# Patient Record
Sex: MALE | Employment: OTHER
[De-identification: names, ages, dates, MRNs, and addresses within clinical notes are randomized per-mention and may not be internally consistent; named-entity substitution may affect disease eponyms.]

---

## 2022-10-14 ENCOUNTER — OFFICE VISIT (OUTPATIENT)
Dept: FAMILY MEDICINE CLINIC | Facility: CLINIC | Age: 87
End: 2022-10-14
Payer: MEDICARE

## 2022-10-14 VITALS
OXYGEN SATURATION: 100 % | DIASTOLIC BLOOD PRESSURE: 60 MMHG | BODY MASS INDEX: 20.99 KG/M2 | SYSTOLIC BLOOD PRESSURE: 112 MMHG | WEIGHT: 126 LBS | HEART RATE: 65 BPM | HEIGHT: 65 IN

## 2022-10-14 DIAGNOSIS — Z85.46 HISTORY OF PROSTATE CANCER: ICD-10-CM

## 2022-10-14 DIAGNOSIS — R19.00 PULSATILE ABDOMINAL MASS: ICD-10-CM

## 2022-10-14 DIAGNOSIS — R73.03 PREDIABETES: ICD-10-CM

## 2022-10-14 DIAGNOSIS — E03.9 ACQUIRED HYPOTHYROIDISM: ICD-10-CM

## 2022-10-14 DIAGNOSIS — G30.1 SEVERE LATE ONSET ALZHEIMER'S DEMENTIA WITHOUT BEHAVIORAL DISTURBANCE, PSYCHOTIC DISTURBANCE, MOOD DISTURBANCE, OR ANXIETY (HCC): ICD-10-CM

## 2022-10-14 DIAGNOSIS — K40.90 INGUINAL HERNIA OF LEFT SIDE WITHOUT OBSTRUCTION OR GANGRENE: Primary | ICD-10-CM

## 2022-10-14 DIAGNOSIS — Z00.00 ANNUAL PHYSICAL EXAM: ICD-10-CM

## 2022-10-14 DIAGNOSIS — Z91.81 AT HIGH RISK FOR FALLS: ICD-10-CM

## 2022-10-14 DIAGNOSIS — F02.C0 SEVERE LATE ONSET ALZHEIMER'S DEMENTIA WITHOUT BEHAVIORAL DISTURBANCE, PSYCHOTIC DISTURBANCE, MOOD DISTURBANCE, OR ANXIETY (HCC): ICD-10-CM

## 2022-10-14 LAB
BASOPHILS # BLD: 0.1 K/UL (ref 0–0.2)
BASOPHILS NFR BLD: 1 % (ref 0–2)
BILIRUBIN, URINE, POC: NEGATIVE
BLOOD URINE, POC: ABNORMAL
DIFFERENTIAL METHOD BLD: ABNORMAL
EOSINOPHIL # BLD: 0.9 K/UL (ref 0–0.8)
EOSINOPHIL NFR BLD: 12 % (ref 0.5–7.8)
ERYTHROCYTE [DISTWIDTH] IN BLOOD BY AUTOMATED COUNT: 13.7 % (ref 11.9–14.6)
EST. AVERAGE GLUCOSE BLD GHB EST-MCNC: 126 MG/DL
GLUCOSE URINE, POC: NEGATIVE
HBA1C MFR BLD: 6 % (ref 4.8–5.6)
HCT VFR BLD AUTO: 43.4 % (ref 41.1–50.3)
HGB BLD-MCNC: 14.1 G/DL (ref 13.6–17.2)
IMM GRANULOCYTES # BLD AUTO: 0 K/UL (ref 0–0.5)
IMM GRANULOCYTES NFR BLD AUTO: 0 % (ref 0–5)
KETONES, URINE, POC: NEGATIVE
LEUKOCYTE ESTERASE, URINE, POC: NEGATIVE
LYMPHOCYTES # BLD: 1.1 K/UL (ref 0.5–4.6)
LYMPHOCYTES NFR BLD: 15 % (ref 13–44)
MCH RBC QN AUTO: 31.3 PG (ref 26.1–32.9)
MCHC RBC AUTO-ENTMCNC: 32.5 G/DL (ref 31.4–35)
MCV RBC AUTO: 96.4 FL (ref 82–102)
MONOCYTES # BLD: 0.4 K/UL (ref 0.1–1.3)
MONOCYTES NFR BLD: 6 % (ref 4–12)
NEUTS SEG # BLD: 4.8 K/UL (ref 1.7–8.2)
NEUTS SEG NFR BLD: 66 % (ref 43–78)
NITRITE, URINE, POC: NEGATIVE
NRBC # BLD: 0 K/UL (ref 0–0.2)
PH, URINE, POC: 7 (ref 4.6–8)
PLATELET # BLD AUTO: 241 K/UL (ref 150–450)
PMV BLD AUTO: 11.3 FL (ref 9.4–12.3)
PROTEIN,URINE, POC: NEGATIVE
RBC # BLD AUTO: 4.5 M/UL (ref 4.23–5.6)
SPECIFIC GRAVITY, URINE, POC: 1.02 (ref 1–1.03)
URINALYSIS CLARITY, POC: CLEAR
URINALYSIS COLOR, POC: YELLOW
UROBILINOGEN, POC: 0.2
WBC # BLD AUTO: 7.3 K/UL (ref 4.3–11.1)

## 2022-10-14 PROCEDURE — 99204 OFFICE O/P NEW MOD 45 MIN: CPT | Performed by: FAMILY MEDICINE

## 2022-10-14 PROCEDURE — 1036F TOBACCO NON-USER: CPT | Performed by: FAMILY MEDICINE

## 2022-10-14 PROCEDURE — 1123F ACP DISCUSS/DSCN MKR DOCD: CPT | Performed by: FAMILY MEDICINE

## 2022-10-14 PROCEDURE — 81003 URINALYSIS AUTO W/O SCOPE: CPT | Performed by: FAMILY MEDICINE

## 2022-10-14 PROCEDURE — G8484 FLU IMMUNIZE NO ADMIN: HCPCS | Performed by: FAMILY MEDICINE

## 2022-10-14 PROCEDURE — G8420 CALC BMI NORM PARAMETERS: HCPCS | Performed by: FAMILY MEDICINE

## 2022-10-14 PROCEDURE — G8427 DOCREV CUR MEDS BY ELIG CLIN: HCPCS | Performed by: FAMILY MEDICINE

## 2022-10-14 SDOH — ECONOMIC STABILITY: FOOD INSECURITY: WITHIN THE PAST 12 MONTHS, THE FOOD YOU BOUGHT JUST DIDN'T LAST AND YOU DIDN'T HAVE MONEY TO GET MORE.: NEVER TRUE

## 2022-10-14 SDOH — ECONOMIC STABILITY: FOOD INSECURITY: WITHIN THE PAST 12 MONTHS, YOU WORRIED THAT YOUR FOOD WOULD RUN OUT BEFORE YOU GOT MONEY TO BUY MORE.: NEVER TRUE

## 2022-10-14 ASSESSMENT — PATIENT HEALTH QUESTIONNAIRE - PHQ9
SUM OF ALL RESPONSES TO PHQ QUESTIONS 1-9: 0
SUM OF ALL RESPONSES TO PHQ9 QUESTIONS 1 & 2: 0
1. LITTLE INTEREST OR PLEASURE IN DOING THINGS: 0
SUM OF ALL RESPONSES TO PHQ QUESTIONS 1-9: 0
2. FEELING DOWN, DEPRESSED OR HOPELESS: 0

## 2022-10-14 ASSESSMENT — ENCOUNTER SYMPTOMS
DIARRHEA: 0
BLOOD IN STOOL: 0
CONSTIPATION: 0
NAUSEA: 0
EYES NEGATIVE: 1
RESPIRATORY NEGATIVE: 1

## 2022-10-14 ASSESSMENT — SOCIAL DETERMINANTS OF HEALTH (SDOH): HOW HARD IS IT FOR YOU TO PAY FOR THE VERY BASICS LIKE FOOD, HOUSING, MEDICAL CARE, AND HEATING?: NOT HARD AT ALL

## 2022-10-14 NOTE — PROGRESS NOTES
PROGRESS NOTE    SUBJECTIVE:   Keyla Dial is a 80 y.o. male seen for a follow up visit regarding   Chief Complaint   Patient presents with    New Patient     Establish care. Reports being diagnosed with Dementia, thyroid disorder and diabetes. Hernia     Reports hernia for the past few years. Had surgery [place] the [place] about 5 years ago and hernia has returned in the past 3 years. Reports pain and it's the size of a baseball. Testicle Swelling     Reports swollen testicle. Unsure of how long the swelling has been there. HPI:  Patient is brought in by his daughter today to establish care with us. Patient is spent most of his life in Hospitals in [place]. He does not speak [place], daughter is serving as  today. Daughter has healthcare power of . Patient has been troubled with a left inguinal hernia, causing some discomfort. Passing his stools without difficulty but family notices that he does complain of the hernia getting bigger when he is trying to have a bowel movement. Patient has a history of dementia which seems to be getting worse. He has been on multiple medications according to his daughter, including a medicine to calm him down when he gets agitated. He still is moderately independent but requires supervision. With direction he is able to perform ADLs. Patient has a history of prostate cancer. Patient reportedly has history of prediabetes, was on some medications but his daughter did not bring them with her today. Reviewed and updated this visit by provider:  Tobacco  Allergies  Meds  Problems  Med Hx  Surg Hx  Fam Hx           Review of Systems   Constitutional:  Negative for appetite change and fever. HENT: Negative. Eyes: Negative. Respiratory: Negative. Cardiovascular: Negative. Gastrointestinal:  Negative for blood in stool, constipation, diarrhea and nausea.    Genitourinary:         Some incontinence, presumed to be associated with his dementia, primarily nocturnal   Psychiatric/Behavioral:  Positive for agitation. OBJECTIVE:  Vitals:    10/14/22 1000   BP: 112/60   Site: Left Upper Arm   Cuff Size: Small Adult   Pulse: 65   SpO2: 100%   Weight: 126 lb (57.2 kg)   Height: 5' 5\" (1.651 m)        Physical Exam   General: He is alert presently, clearly confused, does not appear ill, actually appears quite fit for his age. HEENT: Normocephalic; external ears, ear canals demonstrate moderate cerumen,  Tympanic membranes are normal; PERRLA, EOMI, normal conjunctiva; normal nasal mucosa without drainage; oropharynx is moist without mucosal lesion  Neck: No adenopathy, thyromegaly or thyroid nodules  Pulmonary: Normal effort, good airflow, no rales or rhonchi  CVS: Regular rate and rhythm, normal S1, S2, no S3 or S4, no murmurs; no carotid bruits, 2+ pedal pulses  Abdomen: Nondistended, normal bowel sounds, nontender without mass organomegaly, palpable aorta without bruit  Extremities: No cyanosis/clubbing/edema    Medical problems and test results were reviewed with the patient today. No results found for this or any previous visit (from the past 672 hour(s)). No results found for any visits on 10/14/22. ASSESSMENT and PLAN    1. Inguinal hernia of left side without obstruction or gangrene  -     [facility] - Tyson Schneider MD, General Surgery, [facility]  2. At high risk for falls  3. Prediabetes  -     Basic Metabolic Panel; Future  -     Hepatic Function Panel; Future  -     Lipid Panel; Future  -     Hemoglobin A1C; Future  -     AMB POC URINALYSIS DIP STICK AUTO W/O MICRO  4. Acquired hypothyroidism  -     TSH; Future  5. Severe late onset Alzheimer's dementia without behavioral disturbance, psychotic disturbance, mood disturbance, or anxiety ([facility] [address]  6. Annual physical exam  -     CBC with Auto Differential; Future  7. History of prostate cancer  -     PSA, Diagnostic;  Future     Patient's daughter is going to make us aware of his current medications. No follow-ups on file.        Renata Jacobs MD

## 2022-10-15 LAB
ALBUMIN SERPL-MCNC: 4.4 G/DL (ref 3.2–4.6)
ALBUMIN/GLOB SERPL: 1.4 {RATIO} (ref 0.4–1.6)
ALP SERPL-CCNC: 105 U/L (ref 50–136)
ALT SERPL-CCNC: 26 U/L (ref 12–65)
ANION GAP SERPL CALC-SCNC: 3 MMOL/L (ref 2–11)
AST SERPL-CCNC: 20 U/L (ref 15–37)
BILIRUB DIRECT SERPL-MCNC: 0.2 MG/DL
BILIRUB SERPL-MCNC: 0.7 MG/DL (ref 0.2–1.1)
BUN SERPL-MCNC: 19 MG/DL (ref 8–23)
CALCIUM SERPL-MCNC: 9.5 MG/DL (ref 8.3–10.4)
CHLORIDE SERPL-SCNC: 109 MMOL/L (ref 101–110)
CHOLEST SERPL-MCNC: 186 MG/DL
CO2 SERPL-SCNC: 32 MMOL/L (ref 21–32)
CREAT SERPL-MCNC: 1.2 MG/DL (ref 0.8–1.5)
GLOBULIN SER CALC-MCNC: 3.1 G/DL (ref 2.8–4.5)
GLUCOSE SERPL-MCNC: 133 MG/DL (ref 65–100)
HDLC SERPL-MCNC: 44 MG/DL (ref 40–60)
HDLC SERPL: 4.2 {RATIO}
LDLC SERPL CALC-MCNC: 121.4 MG/DL
POTASSIUM SERPL-SCNC: 3.8 MMOL/L (ref 3.5–5.1)
PROT SERPL-MCNC: 7.5 G/DL (ref 6.3–8.2)
SODIUM SERPL-SCNC: 144 MMOL/L (ref 133–143)
TRIGL SERPL-MCNC: 103 MG/DL (ref 35–150)
TSH, 3RD GENERATION: 5.38 UIU/ML (ref 0.36–3.74)
VLDLC SERPL CALC-MCNC: 20.6 MG/DL (ref 6–23)

## 2022-10-17 ENCOUNTER — TELEPHONE (OUTPATIENT)
Dept: FAMILY MEDICINE CLINIC | Facility: CLINIC | Age: 87
End: 2022-10-17

## 2022-10-17 NOTE — TELEPHONE ENCOUNTER
Medication update    Eutirox 25 mcg    Eutebrol 10 mg    Quetaxil 25 mg    Nootrox 1200 mg    Doneclar 5 mg    Lisiniopril-HCTZ 10/12.5 mg    Sedoxil 1/2 am 1/2 pm    Super B-Complex    Men's one a day

## 2022-10-18 DIAGNOSIS — E03.9 ACQUIRED HYPOTHYROIDISM: Primary | ICD-10-CM

## 2022-10-18 RX ORDER — QUETIAPINE FUMARATE 25 MG/1
25 TABLET, FILM COATED ORAL 2 TIMES DAILY
COMMUNITY

## 2022-10-18 RX ORDER — LEVOTHYROXINE SODIUM 0.05 MG/1
50 TABLET ORAL DAILY
Qty: 90 TABLET | Refills: 1 | Status: SHIPPED | OUTPATIENT
Start: 2022-10-18

## 2022-10-18 RX ORDER — DONEPEZIL HYDROCHLORIDE 5 MG/1
5 TABLET, FILM COATED ORAL NIGHTLY
COMMUNITY

## 2022-10-18 RX ORDER — MEMANTINE HYDROCHLORIDE 10 MG/1
10 TABLET ORAL 2 TIMES DAILY
COMMUNITY
End: 2022-10-18 | Stop reason: DRUGHIGH

## 2022-10-18 RX ORDER — LEVOTHYROXINE SODIUM 0.03 MG/1
25 TABLET ORAL DAILY
COMMUNITY
End: 2022-10-18

## 2022-10-18 RX ORDER — LISINOPRIL AND HYDROCHLOROTHIAZIDE 12.5; 1 MG/1; MG/1
1 TABLET ORAL DAILY
COMMUNITY

## 2022-10-18 RX ORDER — MEMANTINE HYDROCHLORIDE 10 MG/1
10 TABLET ORAL DAILY
Qty: 60 TABLET | Status: SHIPPED | COMMUNITY
Start: 2022-10-18

## 2022-10-18 NOTE — TELEPHONE ENCOUNTER
Patients daughter contacted with lab results. Daughter is wanting to know if patient could discontinue Seroquel and Nootrox. Reports patient is having night mcmullen and hallucinating on medications. Reports that patient does not have depression or bipolar disorder. Unsure of why medications were prescribed by provider in [facility]. Pended Rx for Levothyroxine 50 mcg.

## 2022-10-25 ENCOUNTER — HOSPITAL ENCOUNTER (OUTPATIENT)
Dept: CT IMAGING | Age: 87
Discharge: HOME | End: 2022-10-28
Payer: MEDICARE

## 2022-10-25 DIAGNOSIS — R19.00 PULSATILE ABDOMINAL MASS: ICD-10-CM

## 2022-10-25 PROCEDURE — 74177 CT ABD & PELVIS W/CONTRAST: CPT

## 2022-10-25 PROCEDURE — 6360000004 HC RX CONTRAST MEDICATION

## 2022-10-25 PROCEDURE — 2580000003 HC RX 258

## 2022-10-25 RX ORDER — 0.9 % SODIUM CHLORIDE 0.9 %
100 INTRAVENOUS SOLUTION INTRAVENOUS ONCE
Status: COMPLETED | OUTPATIENT
Start: 2022-10-25 | End: 2022-10-25

## 2022-10-25 RX ORDER — SODIUM CHLORIDE 0.9 % (FLUSH) 0.9 %
10 SYRINGE (ML) INJECTION
Status: COMPLETED | OUTPATIENT
Start: 2022-10-25 | End: 2022-10-25

## 2022-10-25 RX ADMIN — SODIUM CHLORIDE 100 ML: 9 INJECTION, SOLUTION INTRAVENOUS at 10:06

## 2022-10-25 RX ADMIN — DIATRIZOATE MEGLUMINE AND DIATRIZOATE SODIUM 15 ML: 660; 100 LIQUID ORAL; RECTAL at 10:06

## 2022-10-25 RX ADMIN — IOPAMIDOL 100 ML: 755 INJECTION, SOLUTION INTRAVENOUS at 10:05

## 2022-10-25 RX ADMIN — SODIUM CHLORIDE, PRESERVATIVE FREE 10 ML: 5 INJECTION INTRAVENOUS at 10:06

## 2022-10-27 ENCOUNTER — PREP FOR PROCEDURE (OUTPATIENT)
Dept: SURGERY | Age: 87
End: 2022-10-27

## 2022-10-27 ENCOUNTER — OFFICE VISIT (OUTPATIENT)
Dept: SURGERY | Age: 87
End: 2022-10-27
Payer: MEDICARE

## 2022-10-27 VITALS
HEIGHT: 65 IN | WEIGHT: 126 LBS | BODY MASS INDEX: 20.99 KG/M2 | DIASTOLIC BLOOD PRESSURE: 70 MMHG | OXYGEN SATURATION: 96 % | HEART RATE: 81 BPM | SYSTOLIC BLOOD PRESSURE: 120 MMHG

## 2022-10-27 DIAGNOSIS — K40.91 RECURRENT LEFT INGUINAL HERNIA: ICD-10-CM

## 2022-10-27 PROBLEM — K40.90 INGUINAL HERNIA: Status: ACTIVE | Noted: 2022-01-01

## 2022-10-27 PROCEDURE — G8420 CALC BMI NORM PARAMETERS: HCPCS | Performed by: SURGERY

## 2022-10-27 PROCEDURE — 1036F TOBACCO NON-USER: CPT | Performed by: SURGERY

## 2022-10-27 PROCEDURE — 99202 OFFICE O/P NEW SF 15 MIN: CPT | Performed by: SURGERY

## 2022-10-27 PROCEDURE — 1123F ACP DISCUSS/DSCN MKR DOCD: CPT | Performed by: SURGERY

## 2022-10-27 PROCEDURE — G8484 FLU IMMUNIZE NO ADMIN: HCPCS | Performed by: SURGERY

## 2022-10-27 PROCEDURE — G8427 DOCREV CUR MEDS BY ELIG CLIN: HCPCS | Performed by: SURGERY

## 2022-10-27 RX ORDER — PIRACETAM
1200 POWDER (GRAM) MISCELLANEOUS
COMMUNITY
End: 2022-11-09

## 2022-10-27 NOTE — PROGRESS NOTES
[facility]  [facility]. [address] [place] [address]  [phone]    Office Note/H&P/Consult Note   Megan Siddiqui   MRN: [id number]     : 1935        HPI: Megan Siddiqui is a 80 y.o. male who is here to see me today with a recurrent left inguinal hernia. He is from the [facility]. He is here with his daughter and her . Left inguinal hernia was repaired in 2017. It is causing him some discomfort now that this recurred. No fever or chills. No change in bowel habits. Has Alzheimer's dementia. Past Medical History:   Diagnosis Date    Dementia ([address]     Diabetes mellitus ([address]     Hyperthyroidism      Past Surgical History:   Procedure Laterality Date    HERNIA REPAIR      SKIN CANCER EXCISION       Current Outpatient Medications   Medication Sig    Piracetam POWD 1,200 mg by Does not apply route    QUEtiapine (SEROQUEL) 25 MG tablet Take 25 mg by mouth 2 times daily    donepezil (ARICEPT) 5 MG tablet Take 5 mg by mouth nightly    MISC NATURAL PRODUCTS PO Take 0.5 tablets by mouth in the morning and at bedtime Sedoxil    MISC NATURAL PRODUCTS PO Take 1 tablet by mouth daily Nootrox 1200 mg    B Complex-C (SUPER B COMPLEX PO) Take 1 tablet by mouth daily    Multiple Vitamins-Minerals (ONE-A-DAY MENS 50+ PO) Take 1 tablet by mouth daily    memantine (NAMENDA) 10 MG tablet Take 1 tablet by mouth daily    levothyroxine (SYNTHROID) 50 MCG tablet Take 1 tablet by mouth daily    lisinopril-hydroCHLOROthiazide (PRINZIDE;ZESTORETIC) 10-12.5 MG per tablet Take 1 tablet by mouth daily (Patient not taking: Reported on 10/27/2022)     No current facility-administered medications for this visit. Facility-Administered Medications Ordered in Other Visits   Medication Dose Route Frequency    diatrizoate meglumine-sodium (GASTROGRAFIN) 66-10 % solution 15 mL  15 mL Oral ONCE PRN     ALLERGIES:  Patient has no known allergies.     Social History Socioeconomic History    Marital status:      Spouse name: None    Number of children: None    Years of education: None    Highest education level: None   Tobacco Use    Smoking status: Former     Types: Cigarettes     Quit date: 1978     Years since quittin.2    Smokeless tobacco: Never   Vaping Use    Vaping Use: Never used   Substance and Sexual Activity    Alcohol use: Not Currently    Drug use: Never     Social Determinants of Health     Financial Resource Strain: Low Risk     Difficulty of Paying Living Expenses: Not hard at all   Food Insecurity: No Food Insecurity    Worried About Running Out of Food in the Last Year: Never true    Ran Out of Food in the Last Year: Never true     Social History     Tobacco Use   Smoking Status Former    Types: Cigarettes    Quit date: 1978    Years since quittin.2   Smokeless Tobacco Never     Family History   Problem Relation Age of Onset    No Known Problems Mother     No Known Problems Father     Colon Cancer Paternal Aunt        ROS: The patient has no difficulty with chest pain or shortness of breath. No fever or chills. Comprehensive review of systems was otherwise unremarkable except as noted above. Physical Exam:   Constitutional: Alert oriented cooperative patient in no acute distress. /70   Pulse 81   Ht 5' 5\" (1.651 m)   Wt 126 lb (57.2 kg)   SpO2 96%   BMI 20.97 kg/m²   Eyes:Sclera are clear, pupils symmetric   ENMT: ears have no obvious external lesions; no obvious neck masses; no lip lesions  CV: RRR. Normal perfusion; no embolic signs  Resp: No JVD. Breathing is  non-labored. No audible wheezing   GI: soft and non-distended. Large recurrent left indirect inguinal hernia is present which is reducible. No evidence of a right inguinal hernia. Musculoskeletal: no significant asymmetry; normal tone; unremarkable with normal function.    Neuro:  No obvious focal deficits; moves all 4; A&O x3  Psychiatric: normal affect and mood, no memory impairment      Labs:  Lab Results   Component Value Date/Time    WBC 7.3 10/14/2022 10:44 AM    HGB 14.1 10/14/2022 10:44 AM     10/14/2022 10:44 AM     10/14/2022 10:44 AM    K 3.8 10/14/2022 10:44 AM     10/14/2022 10:44 AM    CO2 32 10/14/2022 10:44 AM    BUN 19 10/14/2022 10:44 AM    ALT 26 10/14/2022 10:44 AM       No results found for this or any previous visit. I reviewed patient's medications, labs, and independently reviewed relevant radiologic studies if available. Labs/radiology studies as ordered in connect care or in scanned in media tab if pt is pre-op. Amt of data reviewed moderate-extensive. I spent 20 minutes with him this morning greater than 50% this time was spent in counseling. Assessment/Plan:     )Yun Brown is a 80 y.o. male who has signs and symptoms consistent with the below issues:  Large recurrent left inguinal hernia. They very much would like to have this repaired. He does as well. We will do this openly. They are okay with mesh. I discussed the risks which include bleeding, nerve injury, recurrence as well as infection. All questions were answered. Proceed with recurrent left inguinal hernia repair with mesh.           Sirena Rouse MD,  FACS

## 2022-10-31 ENCOUNTER — PREP FOR PROCEDURE (OUTPATIENT)
Dept: SURGERY | Age: 87
End: 2022-10-31

## 2022-11-01 RX ORDER — SODIUM CHLORIDE 0.9 % (FLUSH) 0.9 %
5-40 SYRINGE (ML) INJECTION EVERY 12 HOURS SCHEDULED
Status: CANCELLED | OUTPATIENT
Start: 2022-11-01

## 2022-11-01 RX ORDER — SODIUM CHLORIDE 9 MG/ML
INJECTION, SOLUTION INTRAVENOUS PRN
Status: CANCELLED | OUTPATIENT
Start: 2022-11-01

## 2022-11-01 RX ORDER — SODIUM CHLORIDE 0.9 % (FLUSH) 0.9 %
5-40 SYRINGE (ML) INJECTION PRN
Status: CANCELLED | OUTPATIENT
Start: 2022-11-01

## 2022-11-09 RX ORDER — QUETIAPINE FUMARATE 25 MG/1
25 TABLET, FILM COATED ORAL NIGHTLY
COMMUNITY

## 2022-11-11 ENCOUNTER — OFFICE VISIT (OUTPATIENT)
Dept: FAMILY MEDICINE CLINIC | Facility: CLINIC | Age: 87
End: 2022-11-11
Payer: MEDICARE

## 2022-11-11 VITALS
DIASTOLIC BLOOD PRESSURE: 68 MMHG | WEIGHT: 127.8 LBS | HEIGHT: 65 IN | SYSTOLIC BLOOD PRESSURE: 118 MMHG | HEART RATE: 59 BPM | OXYGEN SATURATION: 100 % | BODY MASS INDEX: 21.29 KG/M2

## 2022-11-11 DIAGNOSIS — G30.1 SEVERE LATE ONSET ALZHEIMER'S DEMENTIA WITHOUT BEHAVIORAL DISTURBANCE, PSYCHOTIC DISTURBANCE, MOOD DISTURBANCE, OR ANXIETY (HCC): Primary | ICD-10-CM

## 2022-11-11 DIAGNOSIS — E03.9 ACQUIRED HYPOTHYROIDISM: ICD-10-CM

## 2022-11-11 DIAGNOSIS — F02.C0 SEVERE LATE ONSET ALZHEIMER'S DEMENTIA WITHOUT BEHAVIORAL DISTURBANCE, PSYCHOTIC DISTURBANCE, MOOD DISTURBANCE, OR ANXIETY (HCC): Primary | ICD-10-CM

## 2022-11-11 DIAGNOSIS — K40.90 INGUINAL HERNIA OF LEFT SIDE WITHOUT OBSTRUCTION OR GANGRENE: ICD-10-CM

## 2022-11-11 PROCEDURE — 99214 OFFICE O/P EST MOD 30 MIN: CPT | Performed by: FAMILY MEDICINE

## 2022-11-11 PROCEDURE — G8427 DOCREV CUR MEDS BY ELIG CLIN: HCPCS | Performed by: FAMILY MEDICINE

## 2022-11-11 PROCEDURE — 1036F TOBACCO NON-USER: CPT | Performed by: FAMILY MEDICINE

## 2022-11-11 PROCEDURE — G8484 FLU IMMUNIZE NO ADMIN: HCPCS | Performed by: FAMILY MEDICINE

## 2022-11-11 PROCEDURE — 1123F ACP DISCUSS/DSCN MKR DOCD: CPT | Performed by: FAMILY MEDICINE

## 2022-11-11 PROCEDURE — G8420 CALC BMI NORM PARAMETERS: HCPCS | Performed by: FAMILY MEDICINE

## 2022-11-11 RX ORDER — DONEPEZIL HYDROCHLORIDE 5 MG/1
5 TABLET, FILM COATED ORAL NIGHTLY
Qty: 90 TABLET | Refills: 1 | Status: SHIPPED | OUTPATIENT
Start: 2022-11-11 | End: 2023-05-10

## 2022-11-11 RX ORDER — MEMANTINE HYDROCHLORIDE 10 MG/1
10 TABLET ORAL DAILY
Qty: 90 TABLET | Refills: 1 | Status: SHIPPED | OUTPATIENT
Start: 2022-11-11 | End: 2023-05-10

## 2022-11-11 ASSESSMENT — ENCOUNTER SYMPTOMS
RESPIRATORY NEGATIVE: 1
NAUSEA: 0
VOMITING: 0
DIARRHEA: 0

## 2022-11-11 ASSESSMENT — PATIENT HEALTH QUESTIONNAIRE - PHQ9
SUM OF ALL RESPONSES TO PHQ QUESTIONS 1-9: 0
2. FEELING DOWN, DEPRESSED OR HOPELESS: 0
SUM OF ALL RESPONSES TO PHQ QUESTIONS 1-9: 0
SUM OF ALL RESPONSES TO PHQ9 QUESTIONS 1 & 2: 0
1. LITTLE INTEREST OR PLEASURE IN DOING THINGS: 0
SUM OF ALL RESPONSES TO PHQ QUESTIONS 1-9: 0
SUM OF ALL RESPONSES TO PHQ QUESTIONS 1-9: 0

## 2022-11-11 NOTE — PROGRESS NOTES
PROGRESS NOTE    SUBJECTIVE:   Megan Siddiqui is a 80 y.o. male seen for a follow up visit regarding   Chief Complaint   Patient presents with    Hypertension     Brought BP readings from home; reports controlled BP. Depression     Would like to discuss discontinuing Seroquel. PHQ 0 today. Dementia     Would like to discuss if he should take Namenda and Donepezil together        HPI:  Here for follow-up. Doing well. Having some behavior issues at night, gets up and urinates on the floor. He was taking some quetiapine for rest and behavior at night but this does not seem to be effective and his daughter would like to stop this. He takes a benzodiazepine medication from [place], morning and midday but did not take this at bedtime. His daughter would like to try this at bedtime to see if he will rest better. Patient is scheduled for hernia surgery next week. Overall, physically patient is doing well, dementia is his main issue presently. Hypertension    Depression  Memory Loss         Reviewed and updated this visit by provider:  Tobacco  Allergies  Meds  Problems  Med Hx  Surg Hx  Fam Hx           Review of Systems   Respiratory: Negative. Cardiovascular: Negative. Gastrointestinal:  Negative for diarrhea, nausea and vomiting. Psychiatric/Behavioral:  Positive for depression. OBJECTIVE:  Vitals:    11/11/22 1002   BP: 118/68   Site: Left Upper Arm   Cuff Size: Medium Adult   Pulse: 59   SpO2: 100%   Weight: 127 lb 12.8 oz (58 kg)   Height: 5' 5\" (1.651 m)        Physical Exam   General: Alert, pleasantly demented   Pulmonary: Good airflow, no rales or rhonchi  CVS: Regular rate and rhythm, normal S1, normal S2, no S3 or S4, no murmur appreciated    medical problems and test results were reviewed with the patient today. No results found for this or any previous visit (from the past 672 hour(s)). No results found for any visits on 11/11/22. ASSESSMENT and PLAN    1. Severe late onset Alzheimer's dementia without behavioral disturbance, psychotic disturbance, mood disturbance, or anxiety ([facility])  -     donepezil (ARICEPT) 5 MG tablet; Take 1 tablet by mouth nightly, Disp-90 tablet, R-1Normal  -     memantine (NAMENDA) 10 MG tablet; Take 1 tablet by mouth daily, Disp-90 tablet, R-1Normal  2. Acquired hypothyroidism  3. Inguinal hernia of left side without obstruction or gangrene         Return in about 6 months (around 5/11/2023).        Jon Alavrado MD

## 2022-11-13 ENCOUNTER — ANESTHESIA EVENT (OUTPATIENT)
Dept: SURGERY | Age: 87
End: 2022-11-13
Payer: MEDICARE

## 2022-11-14 ENCOUNTER — ANESTHESIA (OUTPATIENT)
Dept: SURGERY | Age: 87
End: 2022-11-14
Payer: MEDICARE

## 2022-11-14 ENCOUNTER — HOSPITAL ENCOUNTER (OUTPATIENT)
Age: 87
Setting detail: OUTPATIENT SURGERY
Discharge: HOME | End: 2022-11-14
Attending: SURGERY | Admitting: SURGERY
Payer: MEDICARE

## 2022-11-14 VITALS
DIASTOLIC BLOOD PRESSURE: 58 MMHG | BODY MASS INDEX: 21.33 KG/M2 | HEART RATE: 80 BPM | TEMPERATURE: 97.5 F | OXYGEN SATURATION: 95 % | RESPIRATION RATE: 14 BRPM | SYSTOLIC BLOOD PRESSURE: 120 MMHG | HEIGHT: 65 IN | WEIGHT: 128 LBS

## 2022-11-14 DIAGNOSIS — K40.91 RECURRENT LEFT INGUINAL HERNIA: Primary | ICD-10-CM

## 2022-11-14 LAB
GLUCOSE BLD STRIP.AUTO-MCNC: 93 MG/DL (ref 65–100)
SERVICE CMNT-IMP: NORMAL

## 2022-11-14 PROCEDURE — 49520 REREPAIR ING HERNIA REDUCE: CPT | Performed by: SURGERY

## 2022-11-14 PROCEDURE — C1781 MESH (IMPLANTABLE): HCPCS | Performed by: SURGERY

## 2022-11-14 PROCEDURE — 2500000003 HC RX 250 WO HCPCS: Performed by: SURGERY

## 2022-11-14 PROCEDURE — 7100000000 HC PACU RECOVERY - FIRST 15 MIN: Performed by: SURGERY

## 2022-11-14 PROCEDURE — 82962 GLUCOSE BLOOD TEST: CPT

## 2022-11-14 PROCEDURE — 6360000002 HC RX W HCPCS: Performed by: NURSE ANESTHETIST, CERTIFIED REGISTERED

## 2022-11-14 PROCEDURE — 6360000002 HC RX W HCPCS: Performed by: SURGERY

## 2022-11-14 PROCEDURE — 2580000003 HC RX 258: Performed by: NURSE ANESTHETIST, CERTIFIED REGISTERED

## 2022-11-14 PROCEDURE — 7100000010 HC PHASE II RECOVERY - FIRST 15 MIN: Performed by: SURGERY

## 2022-11-14 PROCEDURE — 3600000013 HC SURGERY LEVEL 3 ADDTL 15MIN: Performed by: SURGERY

## 2022-11-14 PROCEDURE — 2500000003 HC RX 250 WO HCPCS: Performed by: NURSE ANESTHETIST, CERTIFIED REGISTERED

## 2022-11-14 PROCEDURE — 2580000003 HC RX 258: Performed by: ANESTHESIOLOGY

## 2022-11-14 PROCEDURE — 6370000000 HC RX 637 (ALT 250 FOR IP): Performed by: ANESTHESIOLOGY

## 2022-11-14 PROCEDURE — 7100000001 HC PACU RECOVERY - ADDTL 15 MIN: Performed by: SURGERY

## 2022-11-14 PROCEDURE — 2709999900 HC NON-CHARGEABLE SUPPLY: Performed by: SURGERY

## 2022-11-14 PROCEDURE — 3700000000 HC ANESTHESIA ATTENDED CARE: Performed by: SURGERY

## 2022-11-14 PROCEDURE — 3600000003 HC SURGERY LEVEL 3 BASE: Performed by: SURGERY

## 2022-11-14 PROCEDURE — 7100000011 HC PHASE II RECOVERY - ADDTL 15 MIN: Performed by: SURGERY

## 2022-11-14 PROCEDURE — 3700000001 HC ADD 15 MINUTES (ANESTHESIA): Performed by: SURGERY

## 2022-11-14 DEVICE — PHASIX PLUG AND PATCH, LARGE
Type: IMPLANTABLE DEVICE | Site: GROIN | Status: FUNCTIONAL
Brand: PHASIX

## 2022-11-14 RX ORDER — SODIUM CHLORIDE 0.9 % (FLUSH) 0.9 %
5-40 SYRINGE (ML) INJECTION EVERY 12 HOURS SCHEDULED
Status: DISCONTINUED | OUTPATIENT
Start: 2022-11-14 | End: 2022-11-14 | Stop reason: HOSPADM

## 2022-11-14 RX ORDER — SODIUM CHLORIDE 9 MG/ML
INJECTION, SOLUTION INTRAVENOUS PRN
Status: DISCONTINUED | OUTPATIENT
Start: 2022-11-14 | End: 2022-11-14 | Stop reason: HOSPADM

## 2022-11-14 RX ORDER — ACETAMINOPHEN 500 MG
1000 TABLET ORAL ONCE
Status: COMPLETED | OUTPATIENT
Start: 2022-11-14 | End: 2022-11-14

## 2022-11-14 RX ORDER — LIDOCAINE HYDROCHLORIDE 20 MG/ML
INJECTION, SOLUTION EPIDURAL; INFILTRATION; INTRACAUDAL; PERINEURAL PRN
Status: DISCONTINUED | OUTPATIENT
Start: 2022-11-14 | End: 2022-11-14 | Stop reason: SDUPTHER

## 2022-11-14 RX ORDER — DIPHENHYDRAMINE HYDROCHLORIDE 50 MG/ML
12.5 INJECTION INTRAMUSCULAR; INTRAVENOUS
Status: DISCONTINUED | OUTPATIENT
Start: 2022-11-14 | End: 2022-11-14 | Stop reason: HOSPADM

## 2022-11-14 RX ORDER — HYDROMORPHONE HYDROCHLORIDE 2 MG/ML
0.5 INJECTION, SOLUTION INTRAMUSCULAR; INTRAVENOUS; SUBCUTANEOUS EVERY 10 MIN PRN
Status: DISCONTINUED | OUTPATIENT
Start: 2022-11-14 | End: 2022-11-14 | Stop reason: HOSPADM

## 2022-11-14 RX ORDER — ONDANSETRON 2 MG/ML
INJECTION INTRAMUSCULAR; INTRAVENOUS PRN
Status: DISCONTINUED | OUTPATIENT
Start: 2022-11-14 | End: 2022-11-14 | Stop reason: SDUPTHER

## 2022-11-14 RX ORDER — SODIUM CHLORIDE, SODIUM LACTATE, POTASSIUM CHLORIDE, CALCIUM CHLORIDE 600; 310; 30; 20 MG/100ML; MG/100ML; MG/100ML; MG/100ML
INJECTION, SOLUTION INTRAVENOUS CONTINUOUS
Status: DISCONTINUED | OUTPATIENT
Start: 2022-11-14 | End: 2022-11-14 | Stop reason: HOSPADM

## 2022-11-14 RX ORDER — MIDAZOLAM HYDROCHLORIDE 2 MG/2ML
2 INJECTION, SOLUTION INTRAMUSCULAR; INTRAVENOUS
Status: DISCONTINUED | OUTPATIENT
Start: 2022-11-14 | End: 2022-11-14 | Stop reason: HOSPADM

## 2022-11-14 RX ORDER — KETOROLAC TROMETHAMINE 30 MG/ML
INJECTION, SOLUTION INTRAMUSCULAR; INTRAVENOUS PRN
Status: DISCONTINUED | OUTPATIENT
Start: 2022-11-14 | End: 2022-11-14 | Stop reason: SDUPTHER

## 2022-11-14 RX ORDER — FENTANYL CITRATE 50 UG/ML
100 INJECTION, SOLUTION INTRAMUSCULAR; INTRAVENOUS
Status: DISCONTINUED | OUTPATIENT
Start: 2022-11-14 | End: 2022-11-14 | Stop reason: HOSPADM

## 2022-11-14 RX ORDER — PROCHLORPERAZINE EDISYLATE 5 MG/ML
5 INJECTION INTRAMUSCULAR; INTRAVENOUS
Status: DISCONTINUED | OUTPATIENT
Start: 2022-11-14 | End: 2022-11-14 | Stop reason: HOSPADM

## 2022-11-14 RX ORDER — FENTANYL CITRATE 50 UG/ML
INJECTION, SOLUTION INTRAMUSCULAR; INTRAVENOUS PRN
Status: DISCONTINUED | OUTPATIENT
Start: 2022-11-14 | End: 2022-11-14 | Stop reason: SDUPTHER

## 2022-11-14 RX ORDER — DEXTROSE MONOHYDRATE 100 MG/ML
INJECTION, SOLUTION INTRAVENOUS CONTINUOUS PRN
Status: DISCONTINUED | OUTPATIENT
Start: 2022-11-14 | End: 2022-11-14 | Stop reason: HOSPADM

## 2022-11-14 RX ORDER — PROPOFOL 10 MG/ML
INJECTION, EMULSION INTRAVENOUS PRN
Status: DISCONTINUED | OUTPATIENT
Start: 2022-11-14 | End: 2022-11-14 | Stop reason: SDUPTHER

## 2022-11-14 RX ORDER — OXYCODONE HYDROCHLORIDE AND ACETAMINOPHEN 5; 325 MG/1; MG/1
1 TABLET ORAL EVERY 6 HOURS PRN
Qty: 20 TABLET | Refills: 0 | Status: SHIPPED | OUTPATIENT
Start: 2022-11-14 | End: 2022-11-19

## 2022-11-14 RX ORDER — DEXAMETHASONE SODIUM PHOSPHATE 4 MG/ML
INJECTION, SOLUTION INTRA-ARTICULAR; INTRALESIONAL; INTRAMUSCULAR; INTRAVENOUS; SOFT TISSUE PRN
Status: DISCONTINUED | OUTPATIENT
Start: 2022-11-14 | End: 2022-11-14 | Stop reason: SDUPTHER

## 2022-11-14 RX ORDER — SODIUM CHLORIDE 0.9 % (FLUSH) 0.9 %
5-40 SYRINGE (ML) INJECTION PRN
Status: DISCONTINUED | OUTPATIENT
Start: 2022-11-14 | End: 2022-11-14 | Stop reason: HOSPADM

## 2022-11-14 RX ORDER — SODIUM CHLORIDE, SODIUM LACTATE, POTASSIUM CHLORIDE, CALCIUM CHLORIDE 600; 310; 30; 20 MG/100ML; MG/100ML; MG/100ML; MG/100ML
INJECTION, SOLUTION INTRAVENOUS CONTINUOUS PRN
Status: DISCONTINUED | OUTPATIENT
Start: 2022-11-14 | End: 2022-11-14 | Stop reason: SDUPTHER

## 2022-11-14 RX ORDER — BUPIVACAINE HYDROCHLORIDE AND EPINEPHRINE 5; 5 MG/ML; UG/ML
INJECTION, SOLUTION EPIDURAL; INTRACAUDAL; PERINEURAL PRN
Status: DISCONTINUED | OUTPATIENT
Start: 2022-11-14 | End: 2022-11-14 | Stop reason: HOSPADM

## 2022-11-14 RX ORDER — NEOSTIGMINE METHYLSULFATE 1 MG/ML
INJECTION, SOLUTION INTRAVENOUS PRN
Status: DISCONTINUED | OUTPATIENT
Start: 2022-11-14 | End: 2022-11-14 | Stop reason: SDUPTHER

## 2022-11-14 RX ORDER — EPHEDRINE SULFATE/0.9% NACL/PF 50 MG/5 ML
SYRINGE (ML) INTRAVENOUS PRN
Status: DISCONTINUED | OUTPATIENT
Start: 2022-11-14 | End: 2022-11-14 | Stop reason: SDUPTHER

## 2022-11-14 RX ORDER — OXYCODONE HYDROCHLORIDE 5 MG/1
5 TABLET ORAL
Status: DISCONTINUED | OUTPATIENT
Start: 2022-11-14 | End: 2022-11-14 | Stop reason: HOSPADM

## 2022-11-14 RX ORDER — LIDOCAINE HYDROCHLORIDE 10 MG/ML
1 INJECTION, SOLUTION INFILTRATION; PERINEURAL
Status: DISCONTINUED | OUTPATIENT
Start: 2022-11-14 | End: 2022-11-14 | Stop reason: HOSPADM

## 2022-11-14 RX ORDER — GLYCOPYRROLATE 0.2 MG/ML
INJECTION INTRAMUSCULAR; INTRAVENOUS PRN
Status: DISCONTINUED | OUTPATIENT
Start: 2022-11-14 | End: 2022-11-14 | Stop reason: SDUPTHER

## 2022-11-14 RX ORDER — ROCURONIUM BROMIDE 10 MG/ML
INJECTION, SOLUTION INTRAVENOUS PRN
Status: DISCONTINUED | OUTPATIENT
Start: 2022-11-14 | End: 2022-11-14 | Stop reason: SDUPTHER

## 2022-11-14 RX ADMIN — LIDOCAINE HYDROCHLORIDE 100 MG: 20 INJECTION, SOLUTION EPIDURAL; INFILTRATION; INTRACAUDAL; PERINEURAL at 07:17

## 2022-11-14 RX ADMIN — GLYCOPYRROLATE 0.4 MG: 0.2 INJECTION, SOLUTION INTRAMUSCULAR; INTRAVENOUS at 08:15

## 2022-11-14 RX ADMIN — PROPOFOL 10 MG: 10 INJECTION, EMULSION INTRAVENOUS at 08:19

## 2022-11-14 RX ADMIN — PROPOFOL 10 MG: 10 INJECTION, EMULSION INTRAVENOUS at 08:18

## 2022-11-14 RX ADMIN — PROPOFOL 150 MG: 10 INJECTION, EMULSION INTRAVENOUS at 07:17

## 2022-11-14 RX ADMIN — ONDANSETRON 4 MG: 2 INJECTION INTRAMUSCULAR; INTRAVENOUS at 07:25

## 2022-11-14 RX ADMIN — Medication 3 MG: at 08:15

## 2022-11-14 RX ADMIN — ACETAMINOPHEN 1000 MG: 500 TABLET ORAL at 06:00

## 2022-11-14 RX ADMIN — FENTANYL CITRATE 100 MCG: 50 INJECTION, SOLUTION INTRAMUSCULAR; INTRAVENOUS at 07:17

## 2022-11-14 RX ADMIN — SODIUM CHLORIDE, POTASSIUM CHLORIDE, SODIUM LACTATE AND CALCIUM CHLORIDE: 600; 310; 30; 20 INJECTION, SOLUTION INTRAVENOUS at 06:02

## 2022-11-14 RX ADMIN — Medication 15 MG: at 07:21

## 2022-11-14 RX ADMIN — DEXAMETHASONE SODIUM PHOSPHATE 4 MG: 4 INJECTION, SOLUTION INTRAMUSCULAR; INTRAVENOUS at 07:25

## 2022-11-14 RX ADMIN — Medication 2 G: at 07:28

## 2022-11-14 RX ADMIN — PHENYLEPHRINE HYDROCHLORIDE 100 MCG: 10 INJECTION INTRAVENOUS at 07:20

## 2022-11-14 RX ADMIN — KETOROLAC TROMETHAMINE 15 MG: 30 INJECTION, SOLUTION INTRAMUSCULAR; INTRAVENOUS at 08:17

## 2022-11-14 RX ADMIN — Medication 10 MG: at 07:37

## 2022-11-14 RX ADMIN — ROCURONIUM BROMIDE 30 MG: 50 INJECTION, SOLUTION INTRAVENOUS at 07:19

## 2022-11-14 RX ADMIN — SODIUM CHLORIDE, SODIUM LACTATE, POTASSIUM CHLORIDE, AND CALCIUM CHLORIDE: 600; 310; 30; 20 INJECTION, SOLUTION INTRAVENOUS at 07:05

## 2022-11-14 RX ADMIN — CEFAZOLIN SODIUM 2000 MG: 100 INJECTION, POWDER, LYOPHILIZED, FOR SOLUTION INTRAVENOUS at 06:01

## 2022-11-14 RX ADMIN — Medication 10 MG: at 07:31

## 2022-11-14 RX ADMIN — PROPOFOL 10 MG: 10 INJECTION, EMULSION INTRAVENOUS at 08:25

## 2022-11-14 RX ADMIN — PHENYLEPHRINE HYDROCHLORIDE 100 MCG: 10 INJECTION INTRAVENOUS at 07:21

## 2022-11-14 ASSESSMENT — PAIN - FUNCTIONAL ASSESSMENT: PAIN_FUNCTIONAL_ASSESSMENT: 0-10

## 2022-11-14 NOTE — DISCHARGE INSTRUCTIONS
HERNIA REPAIR    ACTIVITY  As tolerated and as directed by your doctor. You may shower starting tomorrow but do not take a bath until released by your doctor. Avoid lifting more than 5 pounds (pulling and straining). Avoid excessive use of stairs. Take deep breathes and support incision with pillow when you cough. DIET  Clear liquids until no nausea or vomiting; then light diet for the first day. Advance to regular diet on second day, unless directed by your doctor. If nausea or vomiting continues, call your doctor. You may notice that your bowel movements are not regular right after your surgery. This is common. Try to avoid constipation and straining with bowel movements. You may want to take a fiber supplement every day (Miralax). If you have not had a bowel movement after a couple of days, ask your doctor about taking a mild laxative. MEDICATION INTERACTION:  During your procedure you potentially received a medication or medications which may reduce the effectiveness of oral contraceptives. Please consider other forms of contraception for 1 month following your procedure if you are currently using oral contraceptives as your primary form of birth control. In addition to this, we recommend continuing your oral contraceptive as prescribed, unless otherwise instructed by your physician, during this time. CALL YOUR DOCTOR IF   Excessive bleeding that does not stop after holding pressure over the area. Temperature of 101 degrees F or above. Redness, excessive swelling or bruising, and/ or green or yellow, smelly discharge from incision.      After general anesthesia or intravenous sedation, for 24 hours or while taking prescription Narcotics:  Limit your activities  A responsible adult needs to be with you for the next 24 hours  Do not drive and operate hazardous machinery  Do not make important personal or business decisions  Do not drink alcoholic beverages  If you have not urinated within 8 hours after discharge, and you are experiencing discomfort from urinary retention, please go to the nearest ED. If you have sleep apnea and have a CPAP machine, please use it for all naps and sleeping. Please use caution when taking narcotics and any of your home medications that may cause drowsiness. *  Please give a list of your current medications to your Primary Care Provider. *  Please update this list whenever your medications are discontinued, doses are      changed, or new medications (including over-the-counter products) are added. *  Please carry medication information at all times in case of emergency situations. These are general instructions for a healthy lifestyle:  No smoking/ No tobacco products/ Avoid exposure to second hand smoke  Surgeon General's Warning:  Quitting smoking now greatly reduces serious risk to your health. Obesity, smoking, and sedentary lifestyle greatly increases your risk for illness  A healthy diet, regular physical exercise & weight monitoring are important for maintaining a healthy lifestyle    You may be retaining fluid if you have a history of heart failure or if you experience any of the following symptoms:  Weight gain of 3 pounds or more overnight or 5 pounds in a week, increased swelling in our hands or feet or shortness of breath while lying flat in bed. Please call your doctor as soon as you notice any of these symptoms; do not wait until your next office visit.

## 2022-11-14 NOTE — BRIEF OP NOTE
Brief Postoperative Note      Patient: Alfreda Nielsen  YOB: 1935  MRN: [id number]    Date of Procedure: 11/14/2022    Pre-Op Diagnosis: Inguinal hernia [K40.90]  Recurrent    Post-Op Diagnosis: Same       Procedure(s): HERNIA INGUINAL REPAIR/     Surgeon(s):  Khai Mendez MD    Assistant:  * No surgical staff found *    Anesthesia: General    Estimated Blood Loss (mL): Minimal    Complications: None    Specimens:   * No specimens in log *    Implants:  Implant Name Type Inv. Item Serial No.  Lot No. LRB No. Used Action   MESH RIAZ L W1.5XL1. 9IN SYN POLY-4-HYDROXYBUTYRATE PLUG AND - [id number]  MESH RIAZ L W1.5XL1. 9IN SYN POLY-4-HYDROXYBUTYRATE PLUG AND  BARD DAVOL-WD [id number] Left 1 Implanted         Drains: * No LDAs found *    Findings: Indirect    Electronically signed by Eve Brody MD on 11/14/2022 at 8:20 AM

## 2022-11-14 NOTE — OP NOTE
Operative Note      Patient: Shanae Saldana  YOB: 1935  MRN: [id number]    Date of Procedure: 11/14/2022    Pre-Op Diagnosis: Inguinal hernia [K40.90] Recurrent    Post-Op Diagnosis: Same       Procedure(s): HERNIA INGUINAL REPAIR/     Surgeon(s):  Tita Walker MD    Assistant:   * No surgical staff found *    Anesthesia: General    Estimated Blood Loss (mL): Minimal    Complications: None    Specimens:   * No specimens in log *    Implants:  Implant Name Type Inv. Item Serial No.  Lot No. LRB No. Used Action   MESH RIAZ L W1.5XL1. 9IN SYN POLY-4-HYDROXYBUTYRATE PLUG AND - [id number]  MESH RIAZ L W1.5XL1. 9IN SYN POLY-4-HYDROXYBUTYRATE PLUG AND  BARD DAVOL-WD [id number] Left 1 Implanted         Drains: * No LDAs found *    Findings: Indirect    Detailed Description of Procedure:   Dictated   LCI#[id number]    Electronically signed by Gissel Edwards MD on 11/14/2022 at 8:21 AM

## 2022-11-14 NOTE — OP NOTE
[facility]  OPERATIVE REPORT    Name:  Danita Beatty  MR#:  [id number]  :  1935  ACCOUNT #:  [de-identified]  DATE OF SERVICE:  2022      PREOPERATIVE DIAGNOSIS:  Recurrent left inguinal hernia. POSTOPERATIVE DIAGNOSIS:  Recurrent left indirect inguinal hernia. PROCEDURE PERFORMED:  Recurrent left indirect hernia repair with mesh. SURGEON:  Tenisha Portillo MD    ASSISTANT:  None. ANESTHESIA:  General.    COMPLICATIONS:  None. SPECIMENS REMOVED:  None. IMPLANTS:  Phasix plug and patch. ESTIMATED BLOOD LOSS:  Minimal.    COUNTS:  Correct. PROCEDURE:  After the patient was asleep, right inguinal region was prepped and draped in sterile fashion. Timeout was carried out and all were in agreement. Standard right inguinal incision was made. Dissection carried through subcutaneous tissue and Magdy's fascia. External oblique aponeurosis was opened in the direction of its fibers. Keisha Passe was then placed for exposure. The cord structures were isolated and a Penrose drain placed around these. Cord structures were then dissected free and the hernia sac exposed. This was completely dissected free from the cord structures. A Phasix plug and patch was selected. A medium patch was chosen. The plug was placed within the defect and secured in place with interrupted 0 Vicryl's. The patch was then cut in a keyhole fashion placed around the cord. This was then sewn in place with interrupted 0 Vicryl's. The ilioinguinal nerve was identified and spared. 2-0 Prolene utilized to close the external oblique aponeurosis. Interrupted 3-0 Vicryl was used to close Magdy's fascia and the subcutaneous tissue. A 4-0 subcuticular Monocryl was utilized to close the skin followed by Dermabond glue.         Konrad Turner MD      [id number]  D:  2022 8:23  T:  2022 16:09  JOB #:  [id number]

## 2022-11-14 NOTE — ANESTHESIA PROCEDURE NOTES
Airway  Date/Time: 11/14/2022 7:20 AM  Urgency: elective    Airway not difficult    General Information and Staff    Patient location during procedure: OR  Resident/CRNA: KO Caldwell CRNA  Performed: resident/CRNA     Indications and Patient Condition  Indications for airway management: anesthesia  Spontaneous Ventilation: absent  Sedation level: deep  Preoxygenated: yes  Patient position: sniffing  MILS not maintained throughout  Mask difficulty assessment: vent by bag mask + OA or adjuvant +/- NMBA    Final Airway Details  Final airway type: endotracheal airway      Successful airway: ETT  Cuffed: yes   Successful intubation technique: video laryngoscopy  Facilitating devices/methods: intubating stylet  Endotracheal tube insertion site: oral  Blade size: #3  ETT size (mm): 7.5  Cormack-Lehane Classification: grade I - full view of glottis  Placement verified by: chest auscultation and capnometry   Measured from: teeth  ETT to teeth (cm): 21  Number of attempts at approach: 1  Ventilation between attempts: bag mask    Additional Comments  Atraumatic.  Dentition and oral structures remain unchanged

## 2022-11-14 NOTE — ANESTHESIA POSTPROCEDURE EVALUATION
Department of Anesthesiology  Postprocedure Note    Patient: Juanpablo Whitten  MRN: [id number]  YOB: 1935  Date of evaluation: 11/14/2022      Procedure Summary     Date: 11/14/22 Room / Location: [facility] MAIN OR 03 / [facility] MAIN OR    Anesthesia Start: 0705 Anesthesia Stop: 0840    Procedure: HERNIA INGUINAL REPAIR/  (Left: Groin) Diagnosis:       Inguinal hernia      (Inguinal hernia [K40.90])    Providers: Mohan Flannery MD Responsible Provider: Ananya Stevenson MD    Anesthesia Type: General ASA Status: 2          Anesthesia Type: General    Melony Phase I: Melony Score: 7    Melony Phase II: Melony Score: 10      Anesthesia Post Evaluation    Patient location during evaluation: PACU  Patient participation: complete - patient participated  Level of consciousness: awake  Airway patency: patent  Nausea: well controlled. Complications: no  Cardiovascular status: acceptable.   Respiratory status: acceptable  Hydration status: stable

## 2022-11-14 NOTE — PERIOP NOTE
Patient daughter Roverto Jewell verified name and . Order for consent not found in EHR and matches case posting; patient verifies procedure. Type 1B surgery, Phone assessment complete. Orders not received. Labs per surgeon: none  Labs per anesthesia protocol: none    Patient daughter answered medical/surgical history questions at their best of ability. All prior to admission medications documented in [facility]. Patient instructed to take the following medications the day of surgery according to anesthesia guidelines with a small sip of water: Namenda and Synthroid. Hold all vitamins 7 days prior to surgery and NSAIDS 5 days prior to surgery. Prescription meds to hold:vitamin B complex, Multivitamin, and Aricept. Patient daughter Fam Stewart instructed on the following:    > Arrive at [facility], time of arrival to be called the day before by 1700  > NPO after midnight, unless otherwise indicated, including gum, mints, and ice chips  > Responsible adult must drive patient to the hospital, stay during surgery, and patient will need supervision 24 hours after anesthesia  > Use antibacterial Soap in shower the night before surgery and on the morning of surgery  > All piercings must be removed prior to arrival.    > Leave all valuables (money and jewelry) at home but bring insurance card and ID on DOS.   > You may be required to pay a deductible or co-pay on the day of your procedure. You can pre-pay by calling [phone] if your surgery is at the [facility] or [phone] if your surgery is at the [facility]. > Do not wear make-up, nail polish, lotions, cologne, perfumes, powders, or oil on skin. Artificial nails are not permitted.
Pt daughter Jim Gamble and  given discharge instructions at bedside. Daughter does not want . Pt has dementia and will not comprehend . Daughter is pt decision maker. Verbalizes understanding of discharge instructions.
,DirectAddress_Unknown

## 2022-11-21 ENCOUNTER — TELEPHONE (OUTPATIENT)
Dept: FAMILY MEDICINE CLINIC | Facility: CLINIC | Age: 87
End: 2022-11-21

## 2022-11-21 DIAGNOSIS — F03.911 AGITATION DUE TO DEMENTIA: Primary | ICD-10-CM

## 2022-11-21 RX ORDER — LORAZEPAM 0.5 MG/1
.25-.5 TABLET ORAL NIGHTLY
Qty: 30 TABLET | Refills: 2 | Status: SHIPPED | OUTPATIENT
Start: 2022-11-21 | End: 2023-02-19

## 2022-11-21 NOTE — TELEPHONE ENCOUNTER
----- Message from [facility] sent at 11/21/2022 11:09 AM EST -----  Subject: Medication Problem    Medication: Other - Sedoxil not sure of MG  Dosage: half in the morning half in noon 1 pill at bedroom  Ordering Provider: Jon Steele     Question/Problem: Pt daughter name Mary Just wants her father to take   a medication in the [place] for his anxiety that is compatible with Sedoxil. Pt   daughter said Dr. Jon Steele told her she can try to take him off the   medication that was causing him to have hallucinations and he is no longer   having hallucinations and the Sedoxil medication is working. Please call   daughter back at [phone]. Pt daughter wants to know if the office   will call her once prescription is called in.        Pharmacy: [facility]  [facility] [phone] [place] [phone]    ---------------------------------------------------------------------------  --------------  Jamel Herrera INFO  [phone]; OK to leave message on voicemail  ---------------------------------------------------------------------------  --------------    SCRIPT ANSWERS  Relationship to Patient: Other  Representative Name: Evelia Miller  Is the Representative on the appropriate HIPAA document in Epic: Yes

## 2022-11-21 NOTE — TELEPHONE ENCOUNTER
Patient requesting Rx for medication like Sedoxil to be sent to the pharmacy. Reports medication helping patient at nighttime. Please advise.

## 2022-11-22 ENCOUNTER — OFFICE VISIT (OUTPATIENT)
Dept: SURGERY | Age: 87
End: 2022-11-22

## 2022-11-22 VITALS
DIASTOLIC BLOOD PRESSURE: 68 MMHG | HEIGHT: 65 IN | BODY MASS INDEX: 21.09 KG/M2 | SYSTOLIC BLOOD PRESSURE: 110 MMHG | WEIGHT: 126.6 LBS

## 2022-11-22 DIAGNOSIS — Z48.89 POSTOPERATIVE VISIT: ICD-10-CM

## 2022-11-22 PROCEDURE — 99024 POSTOP FOLLOW-UP VISIT: CPT | Performed by: SURGERY

## 2022-11-22 NOTE — PROGRESS NOTES
[facility]  [address] [place] [address], [address] [place] [address]  [phone]    Office Note/H&P/Consult Note   Gokul Shin   MRN: [id number]     : 1935        HPI: Gokul Shin is a 80 y.o. male who is here to see me today status post recurrent left inguinal hernia repair with mesh on 2022. Past Medical History:   Diagnosis Date    Dementia ([address]     Diabetes mellitus ([facility]     pt daughter denies    Former cigarette smoker     History of prostate cancer     treated with  radiation    History of skin cancer     Hyperthyroidism     Sickle cell trait ([facility] [facility]     Thyroid disease     managed with Synthroid     Past Surgical History:   Procedure Laterality Date    CATARACT REMOVAL Bilateral     HERNIA REPAIR Left 2017    HERNIA REPAIR Left 2022    HERNIA INGUINAL REPAIR/  performed by Blake Guillen MD at [address]       Current Outpatient Medications   Medication Sig    LORazepam (ATIVAN) 0.5 MG tablet Take 0.5-1 tablets by mouth nightly for 90 days. donepezil (ARICEPT) 5 MG tablet Take 1 tablet by mouth nightly    memantine (NAMENDA) 10 MG tablet Take 1 tablet by mouth daily    QUEtiapine (SEROQUEL) 25 MG tablet Take 25 mg by mouth at bedtime    MISC NATURAL PRODUCTS PO Take 0.5 tablets by mouth in the morning and at bedtime Sedoxil 1mg    B Complex-C (SUPER B COMPLEX PO) Take 1 tablet by mouth daily    Multiple Vitamins-Minerals (ONE-A-DAY MENS 50+ PO) Take 1 tablet by mouth daily    levothyroxine (SYNTHROID) 50 MCG tablet Take 1 tablet by mouth daily     No current facility-administered medications for this visit. ALLERGIES:  Patient has no known allergies. Social History     Socioeconomic History    Marital status:       Spouse name: None    Number of children: None    Years of education: None    Highest education level: None   Tobacco Use    Smoking status: Former     Packs/day: 1.00     Years: 25.00 Pack years: 25.00     Types: Cigarettes     Quit date: 1978     Years since quittin.3    Smokeless tobacco: Never   Vaping Use    Vaping Use: Never used   Substance and Sexual Activity    Alcohol use: Not Currently    Drug use: Never     Social Determinants of Health     Financial Resource Strain: Low Risk     Difficulty of Paying Living Expenses: Not hard at all   Food Insecurity: No Food Insecurity    Worried About Running Out of Food in the Last Year: Never true    Ran Out of Food in the Last Year: Never true     Social History     Tobacco Use   Smoking Status Former    Packs/day: 1.00    Years: 25.00    Pack years: 25.00    Types: Cigarettes    Quit date: 1978    Years since quittin.3   Smokeless Tobacco Never     Family History   Problem Relation Age of Onset    No Known Problems Mother     No Known Problems Father     Colon Cancer Paternal Aunt        ROS: The patient has no difficulty with chest pain or shortness of breath. No fever or chills. Comprehensive review of systems was otherwise unremarkable except as noted above. Physical Exam:   Constitutional: Alert oriented cooperative patient in no acute distress. /68   Ht 5' 5\" (1.651 m)   Wt 126 lb 9.6 oz (57.4 kg)   BMI 21.07 kg/m²   Eyes:Sclera are clear, pupils symmetric   ENMT: ears have no obvious external lesions; no obvious neck masses; no lip lesions  CV: RRR. Normal perfusion; no embolic signs  Resp: No JVD. Breathing is  non-labored. No audible wheezing   GI: soft and non-distended   incision has healed nicely  Musculoskeletal: no significant asymmetry; normal tone; unremarkable with normal function.    Neuro:  No obvious focal deficits; moves all 4; A&O x3  Psychiatric: normal affect and mood, no memory impairment      Labs:  Lab Results   Component Value Date/Time    WBC 7.3 10/14/2022 10:44 AM    HGB 14.1 10/14/2022 10:44 AM     10/14/2022 10:44 AM     10/14/2022 10:44 AM    K 3.8 10/14/2022 10:44 AM     10/14/2022 10:44 AM    CO2 32 10/14/2022 10:44 AM    BUN 19 10/14/2022 10:44 AM    ALT 26 10/14/2022 10:44 AM       No results found for this or any previous visit. I reviewed patient's medications, labs, and independently reviewed relevant radiologic studies if available. Labs/radiology studies as ordered in [facility] care or in scanned in media tab if pt is pre-op. Amt of data reviewed moderate-extensive. Postop for recurrent left inguinal hernia repair with mesh. Postoperative visit after having a left recurrent inguinal hernia repair with mesh. He has done beautifully.          Assessment/Plan:     )Gokul Shin is a 80 y.o. male who has signs and symptoms consistent with the below issues:  [unfilled]          Moe De MD,  FACS

## 2022-11-23 NOTE — TELEPHONE ENCOUNTER
Contacted Kayleigh with message from provider regarding medication being sent to pharmacy and taking 1/2 tablet initially. Daughter voiced understanding and thanks.

## 2023-01-23 ENCOUNTER — TELEPHONE (OUTPATIENT)
Dept: FAMILY MEDICINE CLINIC | Facility: CLINIC | Age: 88
End: 2023-01-23

## 2023-01-23 DIAGNOSIS — R45.1 AGITATION: Primary | ICD-10-CM

## 2023-01-23 RX ORDER — RISPERIDONE 0.25 MG/1
0.25 TABLET ORAL 2 TIMES DAILY
Qty: 180 TABLET | Refills: 1 | Status: SHIPPED | OUTPATIENT
Start: 2023-01-23 | End: 2023-07-22

## 2023-01-23 NOTE — TELEPHONE ENCOUNTER
Kayleigh contacted. [place]. States after surgery he has been doing \"things\" that she thought was odd. Reports taking his privates out and pee in the floor, takes off his clothes. Kayleigh unsure of what to do. Wanting to know what patient can take during the day time to help. Reports aide that stays with patient during the day is calling her all the time due to patients behavior.     Requesting medication to \"calm patient down\". Patient gets upset easily. When asked to put his clothes back on he gets upset and refuses. They have to call someone to help put his clothes back on.     Rep[place]. Reports visiting patient every 2 months. Wanting to know if she can take some medication to him? Please advise.

## 2023-05-09 DIAGNOSIS — E03.9 ACQUIRED HYPOTHYROIDISM: ICD-10-CM

## 2023-05-09 DIAGNOSIS — G30.1 SEVERE LATE ONSET ALZHEIMER'S DEMENTIA WITHOUT BEHAVIORAL DISTURBANCE, PSYCHOTIC DISTURBANCE, MOOD DISTURBANCE, OR ANXIETY (HCC): ICD-10-CM

## 2023-05-09 DIAGNOSIS — R45.1 AGITATION: ICD-10-CM

## 2023-05-09 DIAGNOSIS — F02.C0 SEVERE LATE ONSET ALZHEIMER'S DEMENTIA WITHOUT BEHAVIORAL DISTURBANCE, PSYCHOTIC DISTURBANCE, MOOD DISTURBANCE, OR ANXIETY (HCC): ICD-10-CM

## 2023-05-09 RX ORDER — LEVOTHYROXINE SODIUM 0.05 MG/1
TABLET ORAL
Qty: 30 TABLET | Refills: 0 | Status: SHIPPED | OUTPATIENT
Start: 2023-05-09

## 2023-05-09 RX ORDER — RISPERIDONE 0.25 MG/1
TABLET ORAL
Qty: 60 TABLET | Refills: 0 | Status: SHIPPED | OUTPATIENT
Start: 2023-05-09

## 2023-05-09 RX ORDER — MEMANTINE HYDROCHLORIDE 10 MG/1
TABLET ORAL
Qty: 30 TABLET | Refills: 0 | Status: SHIPPED | OUTPATIENT
Start: 2023-05-09

## 2023-05-09 RX ORDER — DONEPEZIL HYDROCHLORIDE 5 MG/1
5 TABLET, FILM COATED ORAL NIGHTLY
Qty: 30 TABLET | Refills: 0 | Status: SHIPPED | OUTPATIENT
Start: 2023-05-09 | End: 2023-11-05

## 2023-05-09 NOTE — TELEPHONE ENCOUNTER
LOV 11/11/22. Has appointment scheduled for 5/26/23. Orders pended for 1 month supply. Please advise.

## 2023-11-01 NOTE — ANESTHESIA PRE PROCEDURE
Catheter removed intact. Department of Anesthesiology  Preprocedure Note       Name:  Eusebio Quigley   Age:  80 y.o.  :  1935                                          MRN:  [id number]         Date:  2022      Surgeon: Saul Ruiz):  Rubia Mcclellan MD    Procedure: Procedure(s): HERNIA INGUINAL REPAIR/     Medications prior to admission:   Prior to Admission medications    Medication Sig Start Date End Date Taking?  Authorizing Provider   QUEtiapine (SEROQUEL) 25 MG tablet Take 25 mg by mouth at bedtime   Yes Historical Provider, MD   donepezil (ARICEPT) 5 MG tablet Take 1 tablet by mouth nightly 11/11/22 5/10/23  Cameron Sanchez MD   memantine [facility]) 10 MG tablet Take 1 tablet by mouth daily 11/11/22 5/10/23  Cameron Sanchez MD   MISC NATURAL PRODUCTS PO Take 0.5 tablets by mouth in the morning and at bedtime Sedoxil 1mg    Historical Provider, MD   B Complex-C (SUPER B COMPLEX PO) Take 1 tablet by mouth daily    Historical Provider, MD   Multiple Vitamins-Minerals (ONE-A-DAY MENS 50+ PO) Take 1 tablet by mouth daily    Historical Provider, MD   levothyroxine (SYNTHROID) 50 MCG tablet Take 1 tablet by mouth daily 10/18/22   Cameron Sanchez MD       Current medications:    Current Facility-Administered Medications   Medication Dose Route Frequency Provider Last Rate Last Admin    lidocaine 1 % injection 1 mL  1 mL IntraDERmal Once PRN Marco A Grijalva MD        fentaNYL (SUBLIMAZE) injection 100 mcg  100 mcg IntraVENous Once PRN Marco A Grijalva MD        lactated ringers infusion   IntraVENous Continuous Marco A Grijalva  mL/hr at 22 0602 New Bag at 22 0602    sodium chloride flush 0.9 % injection 5-40 mL  5-40 mL IntraVENous 2 times per day Marco A Grijalva MD        sodium chloride flush 0.9 % injection 5-40 mL  5-40 mL IntraVENous PRN Marco A Grijalva MD        0.9 % sodium chloride infusion   IntraVENous PRN Marco A Grijalva MD        midazolam PF (VERSED) injection 2 mg  2 mg IntraVENous Once PRN Marco A Grijalva MD        sodium chloride flush 0.9 % injection 5-40 mL  5-40 mL IntraVENous 2 times per day Rubia Mcclellan MD        sodium chloride flush 0.9 % injection 5-40 mL  5-40 mL IntraVENous PRN Rubia Mcclellan MD        0.9 % sodium chloride infusion   IntraVENous PRN Rubia Mcclellan MD           Allergies:  No Known Allergies    Problem List:    Patient Active Problem List   Diagnosis Code    Recurrent left inguinal hernia K40.91    Inguinal hernia K40.90       Past Medical History:        Diagnosis Date    D[facility]     Diabetes mellitus ([facility]     pt daughter denies    Former cigarette smoker     History of prostate cancer     treated with  radiation    History of skin cancer     Hyperthyroidism     Sickle cell trait ([facility]     Thyroid disease     managed with Synthroid       Past Surgical History:        Procedure Laterality Date    CATARACT REMOVAL Bilateral     HERNIA REPAIR Left 2017    SKIN CANCER EXCISION         Social History:    Social History     Tobacco Use    Smoking status: Former     Packs/day: 1.00     Years: 25.00     Pack years: 25.00     Types: Cigarettes     Quit date: 1978     Years since quittin.3    Smokeless tobacco: Never   Substance Use Topics    Alcohol use: Not Currently                                Counseling given: Not Answered      Vital Signs (Current):   Vitals:    22 1541 22 0556   BP:  (!) 156/74   Pulse:  54   Resp:  16   Temp:  98 °F (36.7 °C)   TempSrc:  Oral   SpO2:  100%   Weight: 126 lb (57.2 kg) 128 lb (58.1 kg)   Height: 5' 5\" (1.651 m)                                               BP Readings from Last 3 Encounters:   22 (!) 156/74   22 118/68   10/27/22 120/70       NPO Status: Time of last liquid consumption: 06 (water)                        Time of last solid consumption:                         Date of last liquid consumption: 22                        Date of last solid food consumption: 11/13/22    BMI:   Wt Readings from Last 3 Encounters:   11/14/22 128 lb (58.1 kg)   11/11/22 127 lb 12.8 oz (58 kg)   10/27/22 126 lb (57.2 kg)     Body mass index is 21.3 kg/m².     CBC:   Lab Results   Component Value Date/Time    WBC 7.3 10/14/2022 10:44 AM    RBC 4.50 10/14/2022 10:44 AM    HGB 14.1 10/14/2022 10:44 AM    HCT 43.4 10/14/2022 10:44 AM    MCV 96.4 10/14/2022 10:44 AM    RDW 13.7 10/14/2022 10:44 AM     10/14/2022 10:44 AM       CMP:   Lab Results   Component Value Date/Time     10/14/2022 10:44 AM    K 3.8 10/14/2022 10:44 AM     10/14/2022 10:44 AM    CO2 32 10/14/2022 10:44 AM    BUN 19 10/14/2022 10:44 AM    CREATININE 1.20 10/14/2022 10:44 AM    LABGLOM 59 10/14/2022 10:44 AM    GLUCOSE 133 10/14/2022 10:44 AM    PROT 7.5 10/14/2022 10:44 AM    CALCIUM 9.5 10/14/2022 10:44 AM    BILITOT 0.7 10/14/2022 10:44 AM    ALKPHOS 105 10/14/2022 10:44 AM    AST 20 10/14/2022 10:44 AM    ALT 26 10/14/2022 10:44 AM       POC Tests:   Recent Labs     11/14/22  0553   POCGLU 93       Coags: No results found for: PROTIME, INR, APTT    HCG (If Applicable): No results found for: PREGTESTUR, PREGSERUM, HCG, HCGQUANT     ABGs: No results found for: PHART, PO2ART, FMV2UOA, WKA7HPJ, BEART, W0EEPTCW     Type & Screen (If Applicable):  No results found for: LABABO, LABRH    Drug/Infectious Status (If Applicable):  No results found for: HIV, HEPCAB    COVID-19 Screening (If Applicable): No results found for: COVID19        Anesthesia Evaluation  Patient summary reviewed and Nursing notes reviewed no history of anesthetic complications:   Airway: Mallampati: III  TM distance: >3 FB   Neck ROM: limited  Comment: Slightly limited mouth opening and neck extension      Dental:    (+) poor dentition      Pulmonary: breath sounds clear to auscultation                            ROS comment: Former heavy smoker   Cardiovascular:Negative CV ROS  Exercise tolerance: good (>4 METS), Rhythm: regular  Rate: normal                 ROS comment: Denied known CV issues. Neuro/Psych:   (+) depression/anxiety dementia            GI/Hepatic/Renal: Neg GI/Hepatic/Renal ROS            Endo/Other:    (+) hypothyroidism: arthritis:., .                 Abdominal:             Vascular: Other Findings:           Anesthesia Plan      general     ASA 2     (GETA, glidescope in room. Patient with dementia - family present to discuss PMH, plan, and r/b/a. )  Induction: intravenous. Anesthetic plan and risks discussed with patient (family present ).                         Juan David Yin MD   11/14/2022

## 2025-04-01 NOTE — PROGRESS NOTES
HUB to relay description below.      LVM FOR PT TO CALL OFFICE AND SCHEDULE FASTING LAB BRIAN AROUND APRIL 16TH RECHECK HIS POTASSIUM THANK YOU           Patient speaks Bulgarian as their preferred language for their healthcare communication. If there are technical problems using the [employer] mobil unit, please contact Language Services for interpretation at:    [employer] -Navigator ([phone])  General phone: [phone] ( [phone])  Email: [email or web address]    Please always document the use of interpreting services (name and/or 's ID number) in your clinical notes. Our interpreters are available for team members working with limited  English proficient (LEP) patients remotely, in person (if needed for special cases), as phone or video interpreters on the [employer] Mobil units.         Thank you,        Isela HERRERA  Senior /Navigator

## (undated) DEVICE — NEEDLE HYPO 21GA L1.5IN INTRAMUSCULAR S STL LATCH BVL UP

## (undated) DEVICE — SOLUTION IRRIG 1000ML 09% SOD CHL USP PIC PLAS CONTAINER

## (undated) DEVICE — SUTURE VCRL SZ 3-0 L27IN ABSRB UD L26MM SH 1/2 CIR J416H

## (undated) DEVICE — NEEDLE HYPO 25GA L1.5IN BLU POLYPR HUB S STL REG BVL STR

## (undated) DEVICE — DRAIN SURG PENROSE 0.25X12 IN CLOSED WND DRAINAGE PREM SIL

## (undated) DEVICE — SUTURE PROL SZ 2-0 L30IN NONABSORBABLE BLU L26MM CT-2 1/2 8411H

## (undated) DEVICE — STERILE LATEX POWDER FREE SURGICAL GLOVES WITH HYDROGEL COATING: Brand: PROTEXIS

## (undated) DEVICE — MINOR SPLIT GENERAL: Brand: MEDLINE INDUSTRIES, INC.

## (undated) DEVICE — SUTURE VCRL SZ 0 L27IN ABSRB VLT L26MM CT-2 1/2 CIR J334H

## (undated) DEVICE — BLADE CLIPPER GEN PURP NS

## (undated) DEVICE — MALE INCONTINENCE CATHETER, SELF-ADHESIVE: Brand: EXTENDED WEAR

## (undated) DEVICE — BELL CIRC L13CM PED DISPOSABLE PLAS PLASTIBELL

## (undated) DEVICE — SUTURE MCRYL SZ 4-0 L27IN ABSRB UD L19MM PS-2 1/2 CIR PRIM Y426H